# Patient Record
Sex: FEMALE | URBAN - METROPOLITAN AREA
[De-identification: names, ages, dates, MRNs, and addresses within clinical notes are randomized per-mention and may not be internally consistent; named-entity substitution may affect disease eponyms.]

---

## 2023-10-25 ENCOUNTER — TELEPHONE (OUTPATIENT)
Dept: PULMONOLOGY | Facility: CLINIC | Age: 43
End: 2023-10-25

## 2023-10-25 NOTE — TELEPHONE ENCOUNTER
No answer.                ----- Message from Myriam Cantu MA sent at 10/25/2023  1:10 PM CDT -----  [facility] Staff  Caller: Unspecified (Today,  1:05 PM)  Type:  Sooner Appointment Request      Patient is requesting a sooner appointment.  Patient declined first available appointment listed as well as another facility and provider .  Patient will not accept being placed on the waitlist and is requesting a message be sent to doctor.      Name of Caller: Debra     When is the first available appointment? None Available     Symptoms: Need to have her lungs checked due to chemicals she works with.     Would the patient rather a call back or a response via [facility]? Callback     Best Call Back Number: .[phone]       Additional Information: